# Patient Record
Sex: FEMALE | Race: WHITE | NOT HISPANIC OR LATINO | Employment: OTHER | URBAN - METROPOLITAN AREA
[De-identification: names, ages, dates, MRNs, and addresses within clinical notes are randomized per-mention and may not be internally consistent; named-entity substitution may affect disease eponyms.]

---

## 2022-04-28 ENCOUNTER — TELEPHONE (OUTPATIENT)
Dept: UROLOGY | Facility: CLINIC | Age: 72
End: 2022-04-28
Payer: COMMERCIAL

## 2022-05-26 ENCOUNTER — OFFICE VISIT (OUTPATIENT)
Dept: UROLOGY | Facility: CLINIC | Age: 72
End: 2022-05-26
Payer: COMMERCIAL

## 2022-05-26 DIAGNOSIS — N39.41 URGE INCONTINENCE: Primary | ICD-10-CM

## 2022-05-26 LAB — POC RESIDUAL URINE VOLUME: 2 ML (ref 0–100)

## 2022-05-26 PROCEDURE — 99203 PR OFFICE/OUTPT VISIT, NEW, LEVL III, 30-44 MIN: ICD-10-PCS | Mod: S$GLB,,, | Performed by: NURSE PRACTITIONER

## 2022-05-26 PROCEDURE — 51798 POCT BLADDER SCAN: ICD-10-PCS | Mod: S$GLB,,, | Performed by: NURSE PRACTITIONER

## 2022-05-26 PROCEDURE — 1159F PR MEDICATION LIST DOCUMENTED IN MEDICAL RECORD: ICD-10-PCS | Mod: CPTII,S$GLB,, | Performed by: NURSE PRACTITIONER

## 2022-05-26 PROCEDURE — 51798 US URINE CAPACITY MEASURE: CPT | Mod: S$GLB,,, | Performed by: NURSE PRACTITIONER

## 2022-05-26 PROCEDURE — 99203 OFFICE O/P NEW LOW 30 MIN: CPT | Mod: S$GLB,,, | Performed by: NURSE PRACTITIONER

## 2022-05-26 PROCEDURE — 1159F MED LIST DOCD IN RCRD: CPT | Mod: CPTII,S$GLB,, | Performed by: NURSE PRACTITIONER

## 2022-05-26 PROCEDURE — 1160F PR REVIEW ALL MEDS BY PRESCRIBER/CLIN PHARMACIST DOCUMENTED: ICD-10-PCS | Mod: CPTII,S$GLB,, | Performed by: NURSE PRACTITIONER

## 2022-05-26 PROCEDURE — 1160F RVW MEDS BY RX/DR IN RCRD: CPT | Mod: CPTII,S$GLB,, | Performed by: NURSE PRACTITIONER

## 2022-05-26 RX ORDER — SIMVASTATIN 20 MG/1
TABLET, FILM COATED ORAL
COMMUNITY
Start: 2022-04-06

## 2022-05-26 RX ORDER — BIOTIN 10 MG
TABLET ORAL
COMMUNITY

## 2022-05-26 RX ORDER — OXYBUTYNIN CHLORIDE 15 MG/1
TABLET, EXTENDED RELEASE ORAL
COMMUNITY
Start: 2022-03-13 | End: 2022-08-26 | Stop reason: ALTCHOICE

## 2022-05-26 RX ORDER — AMOXICILLIN 500 MG
CAPSULE ORAL DAILY
COMMUNITY

## 2022-05-26 RX ORDER — NAPROXEN SODIUM 220 MG/1
81 TABLET, FILM COATED ORAL EVERY OTHER DAY
COMMUNITY

## 2022-05-26 RX ORDER — LEVOTHYROXINE SODIUM 50 UG/1
TABLET ORAL
COMMUNITY
Start: 2022-04-06

## 2022-05-26 NOTE — PROGRESS NOTES
Subjective:       Patient ID: Clarisa Silvestre is a 71 y.o. female.    Chief Complaint: urinary incontienence      HPI: 71-year-old female, new to [facility].  Patient presents with complaint of urge incontinence.  Patient states he has leakage his with episodes of urgency.  Patient states being on for probably 2 years.  She was started on oxybutynin by her PCP.  States he was initially started on 5 mg without any significant improvement.  That dose was increased to 15 mg daily.  Patient states this was successful up until approximately 2 months ago.  Two months ago she started having a recurrence of leakage.    She denies any pain or burning urination.  Denies any odor urine.  Denies any fever.  Denies any body aches.  Denies any blood in urine.  No other urinary complaints at this time.       Past Medical History:   Past Medical History:   Diagnosis Date    DVT (deep venous thrombosis)     left leg 1987    Malignant melanoma of skin, unspecified     2002/2004 mole to left arm    Mixed hyperlipidemia     Thyroid disease        Past Surgical Historical:   Past Surgical History:   Procedure Laterality Date    HYSTERECTOMY      melanoma removal Left     arm        Medications:   Medication List with Changes/Refills   Current Medications    ASPIRIN 81 MG CHEW    Take 81 mg by mouth every other day.    BIOTIN 10 MG TAB    Take by mouth.    LEVOTHYROXINE (SYNTHROID) 50 MCG TABLET        OMEGA-3 FATTY ACIDS/FISH OIL (FISH OIL-OMEGA-3 FATTY ACIDS) 300-1,000 MG CAPSULE    Take by mouth once daily.    OXYBUTYNIN (DITROPAN XL) 15 MG TR24        SIMVASTATIN (ZOCOR) 20 MG TABLET            Past Social History:   Social History     Socioeconomic History    Marital status:    Tobacco Use    Smoking status: Never Smoker    Smokeless tobacco: Never Used   Substance and Sexual Activity    Alcohol use: Yes     Alcohol/week: 1.0 standard drink     Types: 1 Glasses of wine per week     Comment: every couple of  months    Drug use: Never    Sexual activity: Yes     Partners: Male       Allergies: Review of patient's allergies indicates:  No Known Allergies     Family History:   Family History   Problem Relation Age of Onset    Hyperlipidemia Father     Memory loss Father     Heart disease Mother     Kidney disease Mother     Cancer Paternal Grandmother     Heart disease Brother     Heart attack Brother         Review of Systems:  Review of Systems   Constitutional: Negative for activity change and appetite change.   HENT: Negative for congestion and dental problem.    Respiratory: Negative for chest tightness and shortness of breath.    Cardiovascular: Negative for chest pain.   Gastrointestinal: Negative for abdominal distention.   Genitourinary: Positive for urgency. Negative for decreased urine volume, difficulty urinating, dyspareunia, dysuria, enuresis, flank pain, frequency, genital sores, hematuria and pelvic pain.   Musculoskeletal: Negative for back pain and neck pain.   Allergic/Immunologic: Negative for immunocompromised state.   Neurological: Negative for dizziness.   Hematological: Negative for adenopathy.   Psychiatric/Behavioral: Negative for agitation, behavioral problems and confusion.       Physical Exam:  Physical Exam  Vitals and nursing note reviewed.   Constitutional:       Appearance: She is well-developed.   HENT:      Head: Normocephalic.   Cardiovascular:      Rate and Rhythm: Normal rate and regular rhythm.      Heart sounds: Normal heart sounds.   Pulmonary:      Effort: Pulmonary effort is normal.      Breath sounds: Normal breath sounds.   Abdominal:      General: Bowel sounds are normal.      Palpations: Abdomen is soft.   Skin:     General: Skin is warm and dry.   Neurological:      Mental Status: She is alert and oriented to person, place, and time.       Urinalysis:  Trace intact blood, blood cells 0-3.  Bladder scan:  2 cc    Assessment/Plan:   Urge incontinence:  Patient has  recently started experiencing failure with oxybutynin XL 15 mg daily.  Do trial run of Myrbetriq 25 mg daily.  Patient provided 8 weeks supply of samples.  Will re-evaluate at next visit.    Will plan follow-up in 8 weeks, sooner if needed.  Problem List Items Addressed This Visit    None     Visit Diagnoses     Urge incontinence    -  Primary    Relevant Orders    POCT Urinalysis (w/Micro Option)    POCT Bladder Scan

## 2022-07-28 ENCOUNTER — OFFICE VISIT (OUTPATIENT)
Dept: UROLOGY | Facility: CLINIC | Age: 72
End: 2022-07-28
Payer: COMMERCIAL

## 2022-07-28 DIAGNOSIS — N39.41 URGE INCONTINENCE: Primary | ICD-10-CM

## 2022-07-28 LAB — POC RESIDUAL URINE VOLUME: 0 ML (ref 0–100)

## 2022-07-28 PROCEDURE — 1160F RVW MEDS BY RX/DR IN RCRD: CPT | Mod: CPTII,S$GLB,, | Performed by: NURSE PRACTITIONER

## 2022-07-28 PROCEDURE — 1160F PR REVIEW ALL MEDS BY PRESCRIBER/CLIN PHARMACIST DOCUMENTED: ICD-10-PCS | Mod: CPTII,S$GLB,, | Performed by: NURSE PRACTITIONER

## 2022-07-28 PROCEDURE — 99213 OFFICE O/P EST LOW 20 MIN: CPT | Mod: S$GLB,,, | Performed by: NURSE PRACTITIONER

## 2022-07-28 PROCEDURE — 51798 US URINE CAPACITY MEASURE: CPT | Mod: S$GLB,,, | Performed by: NURSE PRACTITIONER

## 2022-07-28 PROCEDURE — 1159F MED LIST DOCD IN RCRD: CPT | Mod: CPTII,S$GLB,, | Performed by: NURSE PRACTITIONER

## 2022-07-28 PROCEDURE — 99213 PR OFFICE/OUTPT VISIT, EST, LEVL III, 20-29 MIN: ICD-10-PCS | Mod: S$GLB,,, | Performed by: NURSE PRACTITIONER

## 2022-07-28 PROCEDURE — 1159F PR MEDICATION LIST DOCUMENTED IN MEDICAL RECORD: ICD-10-PCS | Mod: CPTII,S$GLB,, | Performed by: NURSE PRACTITIONER

## 2022-07-28 PROCEDURE — 51798 POCT BLADDER SCAN: ICD-10-PCS | Mod: S$GLB,,, | Performed by: NURSE PRACTITIONER

## 2022-07-28 RX ORDER — GUAIFENESIN AND PHENYLEPHRINE HCL 385; 10 MG/1; MG/1
1 TABLET ORAL EVERY 4 HOURS PRN
COMMUNITY
Start: 2022-07-21

## 2022-07-28 RX ORDER — NIRMATRELVIR AND RITONAVIR 300-100 MG
KIT ORAL
COMMUNITY
Start: 2022-07-21

## 2022-08-26 ENCOUNTER — TELEPHONE (OUTPATIENT)
Dept: UROLOGY | Facility: CLINIC | Age: 72
End: 2022-08-26
Payer: COMMERCIAL

## 2022-08-26 RX ORDER — SOLIFENACIN SUCCINATE 10 MG/1
10 TABLET, FILM COATED ORAL DAILY
Qty: 30 TABLET | Refills: 11 | Status: SHIPPED | OUTPATIENT
Start: 2022-08-26 | End: 2022-11-10 | Stop reason: SDUPTHER

## 2022-08-26 NOTE — TELEPHONE ENCOUNTER
Will try Vesicare    ----- Message from Yelena Azar MA sent at 8/25/2022  4:47 PM CDT -----    ----- Message -----  From: Shama Cheema  Sent: 8/25/2022  12:57 PM CDT  To: Emiliano Oliver Staff    Type:  Needs Medical Advice    Who Called: Clarisa Silvestre    Symptoms (please be specific): -   How long has patient had these symptoms:  -  Pharmacy name and phone #:    [facility] (Now [facility]) - [place], [place] - [address]  [address] [address]  Phone: [phone] Fax: [phone]  Would the patient rather a call back or a response via [facility]?    Best Call Back Number: [phone]    Additional Information: pt wants to know if there is something more affordable that you can call out for her she says the myrbetriq samples worked how ever the medication is to expensive for her

## 2022-11-10 RX ORDER — SOLIFENACIN SUCCINATE 10 MG/1
10 TABLET, FILM COATED ORAL DAILY
Qty: 90 TABLET | Refills: 0 | Status: SHIPPED | OUTPATIENT
Start: 2022-11-10 | End: 2023-01-11 | Stop reason: SDUPTHER

## 2023-01-11 ENCOUNTER — OFFICE VISIT (OUTPATIENT)
Dept: UROLOGY | Facility: CLINIC | Age: 73
End: 2023-01-11
Payer: COMMERCIAL

## 2023-01-11 VITALS
WEIGHT: 217 LBS | BODY MASS INDEX: 38.45 KG/M2 | SYSTOLIC BLOOD PRESSURE: 133 MMHG | HEART RATE: 90 BPM | HEIGHT: 63 IN | DIASTOLIC BLOOD PRESSURE: 70 MMHG

## 2023-01-11 DIAGNOSIS — N39.41 URGE INCONTINENCE: Primary | ICD-10-CM

## 2023-01-11 PROCEDURE — 1159F PR MEDICATION LIST DOCUMENTED IN MEDICAL RECORD: ICD-10-PCS | Mod: CPTII,S$GLB,, | Performed by: NURSE PRACTITIONER

## 2023-01-11 PROCEDURE — 1160F PR REVIEW ALL MEDS BY PRESCRIBER/CLIN PHARMACIST DOCUMENTED: ICD-10-PCS | Mod: CPTII,S$GLB,, | Performed by: NURSE PRACTITIONER

## 2023-01-11 PROCEDURE — 3078F DIAST BP <80 MM HG: CPT | Mod: CPTII,S$GLB,, | Performed by: NURSE PRACTITIONER

## 2023-01-11 PROCEDURE — 3075F PR MOST RECENT SYSTOLIC BLOOD PRESS GE 130-139MM HG: ICD-10-PCS | Mod: CPTII,S$GLB,, | Performed by: NURSE PRACTITIONER

## 2023-01-11 PROCEDURE — 3075F SYST BP GE 130 - 139MM HG: CPT | Mod: CPTII,S$GLB,, | Performed by: NURSE PRACTITIONER

## 2023-01-11 PROCEDURE — 99213 PR OFFICE/OUTPT VISIT, EST, LEVL III, 20-29 MIN: ICD-10-PCS | Mod: S$GLB,,, | Performed by: NURSE PRACTITIONER

## 2023-01-11 PROCEDURE — 3008F BODY MASS INDEX DOCD: CPT | Mod: CPTII,S$GLB,, | Performed by: NURSE PRACTITIONER

## 2023-01-11 PROCEDURE — 3078F PR MOST RECENT DIASTOLIC BLOOD PRESSURE < 80 MM HG: ICD-10-PCS | Mod: CPTII,S$GLB,, | Performed by: NURSE PRACTITIONER

## 2023-01-11 PROCEDURE — 99213 OFFICE O/P EST LOW 20 MIN: CPT | Mod: S$GLB,,, | Performed by: NURSE PRACTITIONER

## 2023-01-11 PROCEDURE — 1159F MED LIST DOCD IN RCRD: CPT | Mod: CPTII,S$GLB,, | Performed by: NURSE PRACTITIONER

## 2023-01-11 PROCEDURE — 3008F PR BODY MASS INDEX (BMI) DOCUMENTED: ICD-10-PCS | Mod: CPTII,S$GLB,, | Performed by: NURSE PRACTITIONER

## 2023-01-11 PROCEDURE — 1160F RVW MEDS BY RX/DR IN RCRD: CPT | Mod: CPTII,S$GLB,, | Performed by: NURSE PRACTITIONER

## 2023-01-11 RX ORDER — SOLIFENACIN SUCCINATE 10 MG/1
10 TABLET, FILM COATED ORAL DAILY
Qty: 90 TABLET | Refills: 3 | Status: SHIPPED | OUTPATIENT
Start: 2023-01-11 | End: 2024-02-22 | Stop reason: SDUPTHER

## 2023-01-11 NOTE — PROGRESS NOTES
Subjective:       Patient ID: Clarisa Silvestre is a 72 y.o. female.    Chief Complaint: Other (3mth rescheduled/vesicare working)      HPI: 72-year-old female, established patient, presents for 6 month visit.    Patient has history of urge incontinence.    Patient was on oxybutynin XL 15 mg daily.  Oxybutynin was helping.  Patient had rated her improvement at 75%.    We tried Myrbetriq 25 mg which did not provide any relief.  We tried higher dose of 50 mg which again did not provide any relief.    We switched her to VESIcare 10 mg.    Patient states VESIcare provides 95% improvement of her symptoms.    Patient denies any pain or burning urination.  Denies any difficulty voiding.  Significant frequency, urgency, or nocturia.    Denies any leakage with stress.  Leakage with urgency is well controlled.    No other urinary complaints at this time.       Past Medical History:   Past Medical History:   Diagnosis Date    COVID 07/2022    DVT (deep venous thrombosis)     left leg 1987    Malignant melanoma of skin, unspecified     2002/2004 mole to left arm    Mixed hyperlipidemia     Thyroid disease        Past Surgical Historical:   Past Surgical History:   Procedure Laterality Date    HYSTERECTOMY      melanoma removal Left     arm        Medications:   Medication List with Changes/Refills   Current Medications    ASPIRIN 81 MG CHEW    Take 81 mg by mouth every other day.    BIOTIN 10 MG TAB    Take by mouth.    DECONEX IR  MG TAB    Take 1 tablet by mouth every 4 (four) hours as needed.    LEVOTHYROXINE (SYNTHROID) 50 MCG TABLET        OMEGA-3 FATTY ACIDS/FISH OIL (FISH OIL-OMEGA-3 FATTY ACIDS) 300-1,000 MG CAPSULE    Take by mouth once daily.    PAXLOVID, EUA, 300 MG (150 MG X 2)-100 MG COPACKAGED TABLETS (EUA)    TK 2 NIRMATRELVIR TS AND 1 RITONAVIR T TOGETHER PO BID FOR 5 DAYS BID FOR 5 DAYS    SIMVASTATIN (ZOCOR) 20 MG TABLET       Changed and/or Refilled Medications    Modified Medication Previous Medication     SOLIFENACIN (VESICARE) 10 MG TABLET solifenacin (VESICARE) 10 MG tablet       Take 1 tablet (10 mg total) by mouth once daily.    Take 1 tablet (10 mg total) by mouth once daily.        Past Social History:   Social History     Socioeconomic History    Marital status:    Tobacco Use    Smoking status: Never    Smokeless tobacco: Never   Substance and Sexual Activity    Alcohol use: Yes     Alcohol/week: 1.0 standard drink     Types: 1 Glasses of wine per week     Comment: every couple of months    Drug use: Never    Sexual activity: Yes     Partners: Male       Allergies: Review of patient's allergies indicates:  No Known Allergies     Family History:   Family History   Problem Relation Age of Onset    Hyperlipidemia Father     Memory loss Father     Heart disease Mother     Kidney disease Mother     Cancer Paternal Grandmother     Heart disease Brother     Heart attack Brother         Review of Systems:  Review of Systems   Constitutional:  Negative for activity change and appetite change.   HENT:  Negative for congestion and dental problem.    Respiratory:  Negative for chest tightness and shortness of breath.    Cardiovascular:  Negative for chest pain.   Gastrointestinal:  Negative for abdominal distention.   Genitourinary:  Negative for decreased urine volume, difficulty urinating, dyspareunia, dysuria, enuresis, flank pain, frequency, genital sores, hematuria, pelvic pain and urgency.   Musculoskeletal:  Negative for back pain and neck pain.   Allergic/Immunologic: Negative for immunocompromised state.   Neurological:  Negative for dizziness.   Hematological:  Negative for adenopathy.   Psychiatric/Behavioral:  Negative for agitation, behavioral problems and confusion.      Physical Exam:  Physical Exam  Vitals and nursing note reviewed.   Constitutional:       Appearance: She is well-developed.   HENT:      Head: Normocephalic.   Cardiovascular:      Rate and Rhythm: Normal rate and regular rhythm.       Heart sounds: Normal heart sounds.   Pulmonary:      Effort: Pulmonary effort is normal.      Breath sounds: Normal breath sounds.   Abdominal:      General: Bowel sounds are normal.      Palpations: Abdomen is soft.   Skin:     General: Skin is warm and dry.   Neurological:      Mental Status: She is alert and oriented to person, place, and time.       Assessment/Plan:   Urge incontinence:  Patient is doing VESIcare 10 mg daily.    Patient will continue as directed.  Refill sent to pharmacy.      Follow-up 1 year, sooner if needed.  Problem List Items Addressed This Visit    None  Visit Diagnoses       Urge incontinence    -  Primary

## 2024-02-22 ENCOUNTER — OFFICE VISIT (OUTPATIENT)
Dept: UROLOGY | Facility: CLINIC | Age: 74
End: 2024-02-22
Payer: COMMERCIAL

## 2024-02-22 VITALS
HEART RATE: 87 BPM | DIASTOLIC BLOOD PRESSURE: 88 MMHG | WEIGHT: 171 LBS | HEIGHT: 63 IN | OXYGEN SATURATION: 98 % | BODY MASS INDEX: 30.3 KG/M2 | SYSTOLIC BLOOD PRESSURE: 124 MMHG

## 2024-02-22 DIAGNOSIS — N39.41 URGE INCONTINENCE: Primary | ICD-10-CM

## 2024-02-22 LAB
BILIRUBIN, UA POC OHS: NEGATIVE
BLOOD, UA POC OHS: ABNORMAL
CLARITY, UA POC OHS: CLEAR
COLOR, UA POC OHS: YELLOW
GLUCOSE, UA POC OHS: NEGATIVE
KETONES, UA POC OHS: NEGATIVE
LEUKOCYTES, UA POC OHS: ABNORMAL
NITRITE, UA POC OHS: NEGATIVE
PH, UA POC OHS: 6
PROTEIN, UA POC OHS: NEGATIVE
SPECIFIC GRAVITY, UA POC OHS: 1.01
UROBILINOGEN, UA POC OHS: 0.2

## 2024-02-22 PROCEDURE — 1101F PT FALLS ASSESS-DOCD LE1/YR: CPT | Mod: CPTII,S$GLB,, | Performed by: NURSE PRACTITIONER

## 2024-02-22 PROCEDURE — 1126F AMNT PAIN NOTED NONE PRSNT: CPT | Mod: CPTII,S$GLB,, | Performed by: NURSE PRACTITIONER

## 2024-02-22 PROCEDURE — 99213 OFFICE O/P EST LOW 20 MIN: CPT | Mod: S$GLB,,, | Performed by: NURSE PRACTITIONER

## 2024-02-22 PROCEDURE — 1160F RVW MEDS BY RX/DR IN RCRD: CPT | Mod: CPTII,S$GLB,, | Performed by: NURSE PRACTITIONER

## 2024-02-22 PROCEDURE — 3079F DIAST BP 80-89 MM HG: CPT | Mod: CPTII,S$GLB,, | Performed by: NURSE PRACTITIONER

## 2024-02-22 PROCEDURE — 81003 URINALYSIS AUTO W/O SCOPE: CPT | Mod: QW,S$GLB,, | Performed by: NURSE PRACTITIONER

## 2024-02-22 PROCEDURE — 3288F FALL RISK ASSESSMENT DOCD: CPT | Mod: CPTII,S$GLB,, | Performed by: NURSE PRACTITIONER

## 2024-02-22 PROCEDURE — 3008F BODY MASS INDEX DOCD: CPT | Mod: CPTII,S$GLB,, | Performed by: NURSE PRACTITIONER

## 2024-02-22 PROCEDURE — 1159F MED LIST DOCD IN RCRD: CPT | Mod: CPTII,S$GLB,, | Performed by: NURSE PRACTITIONER

## 2024-02-22 PROCEDURE — 3074F SYST BP LT 130 MM HG: CPT | Mod: CPTII,S$GLB,, | Performed by: NURSE PRACTITIONER

## 2024-02-22 RX ORDER — SOLIFENACIN SUCCINATE 10 MG/1
10 TABLET, FILM COATED ORAL DAILY
Qty: 90 TABLET | Refills: 3 | Status: SHIPPED | OUTPATIENT
Start: 2024-02-22 | End: 2025-02-21

## 2024-02-22 NOTE — PROGRESS NOTES
Subjective:       Patient ID: Clarisa Silvestre is a 73 y.o. female.    Chief Complaint: urge incontinance      HPI: 73-year-old female, established patient, presents for yearly visit.    Patient has history of urge incontinence.    Patient is maintained on solifenacin 10 mg daily.    Patient states is working well with no significant complaints.  On a scale of 10 she would rate her satisfaction an 8/10.  She denies any significant frequency or urgency.  May have some occasional leakage if she does not go to the bathroom  Denies any fever or body aches.  Denies any pain or burning urination.  Denies any odor urine.    No other urinary complaints at this time.         Past Medical History:   Past Medical History:   Diagnosis Date    COVID 07/2022    DVT (deep venous thrombosis)     left leg 1987    Malignant melanoma of skin, unspecified     2002/2004 mole to left arm    Mixed hyperlipidemia     Thyroid disease        Past Surgical Historical:   Past Surgical History:   Procedure Laterality Date    BREAST BIOPSY Right     HYSTERECTOMY      melanoma removal Left     arm        Medications:   Medication List with Changes/Refills   Current Medications    ASPIRIN 81 MG CHEW    Take 81 mg by mouth every other day.    BIOTIN 10 MG TAB    Take by mouth.    DECONEX IR  MG TAB    Take 1 tablet by mouth every 4 (four) hours as needed.    LEVOTHYROXINE (SYNTHROID) 50 MCG TABLET        OMEGA-3 FATTY ACIDS/FISH OIL (FISH OIL-OMEGA-3 FATTY ACIDS) 300-1,000 MG CAPSULE    Take by mouth once daily.    PAXLOVID, EUA, 300 MG (150 MG X 2)-100 MG COPACKAGED TABLETS (EUA)    TK 2 NIRMATRELVIR TS AND 1 RITONAVIR T TOGETHER PO BID FOR 5 DAYS BID FOR 5 DAYS    SIMVASTATIN (ZOCOR) 20 MG TABLET       Changed and/or Refilled Medications    Modified Medication Previous Medication    SOLIFENACIN (VESICARE) 10 MG TABLET solifenacin (VESICARE) 10 MG tablet       Take 1 tablet (10 mg total) by mouth once daily.    Take 1 tablet (10 mg total) by  mouth once daily.        Past Social History:   Social History     Socioeconomic History    Marital status:    Tobacco Use    Smoking status: Never    Smokeless tobacco: Never   Substance and Sexual Activity    Alcohol use: Yes     Alcohol/week: 1.0 standard drink of alcohol     Types: 1 Glasses of wine per week     Comment: every couple of months    Drug use: Never    Sexual activity: Yes     Partners: Male       Allergies: Review of patient's allergies indicates:  No Known Allergies     Family History:   Family History   Problem Relation Age of Onset    Hyperlipidemia Father     Memory loss Father     Heart disease Mother     Kidney disease Mother     Cancer Paternal Grandmother     Heart disease Brother     Heart attack Brother         Review of Systems:  Review of Systems   Constitutional:  Negative for activity change and appetite change.   HENT:  Negative for congestion and dental problem.    Respiratory:  Negative for chest tightness and shortness of breath.    Cardiovascular:  Negative for chest pain.   Gastrointestinal:  Negative for abdominal distention.   Genitourinary:  Negative for decreased urine volume, difficulty urinating, dyspareunia, dysuria, enuresis, flank pain, frequency, genital sores, hematuria, pelvic pain and urgency.   Musculoskeletal:  Negative for back pain and neck pain.   Allergic/Immunologic: Negative for immunocompromised state.   Neurological:  Negative for dizziness.   Hematological:  Negative for adenopathy.   Psychiatric/Behavioral:  Negative for agitation, behavioral problems and confusion.        Physical Exam:  Physical Exam  Vitals and nursing note reviewed.   Constitutional:       Appearance: She is well-developed.   HENT:      Head: Normocephalic.   Cardiovascular:      Rate and Rhythm: Normal rate and regular rhythm.      Heart sounds: Normal heart sounds.   Pulmonary:      Effort: Pulmonary effort is normal.      Breath sounds: Normal breath sounds.   Abdominal:       General: Bowel sounds are normal.      Palpations: Abdomen is soft.   Skin:     General: Skin is warm and dry.   Neurological:      Mental Status: She is alert and oriented to person, place, and time.       Urinalysis: Small leukocytes, white blood cell 6 to 10, bacteria +1.  Trace lysed blood, red blood cells 0-3.    Assessment/Plan:   1. Urge incontinence:  Patient is doing well on solifenacin 10 mg daily.    Patient will continue as directed.  Refill sent to pharmacy.    Did discuss that she had some bacteria in her urine.  Patient declines a urine culture.      Patient follow-up in 1 year, sooner if needed.  Problem List Items Addressed This Visit    None  Visit Diagnoses       Urge incontinence    -  Primary    Relevant Medications    solifenacin (VESICARE) 10 MG tablet    Other Relevant Orders    POCT Urinalysis(Instrument)

## 2024-12-09 DIAGNOSIS — N39.41 URGE INCONTINENCE: ICD-10-CM

## 2024-12-09 RX ORDER — SOLIFENACIN SUCCINATE 10 MG/1
10 TABLET, FILM COATED ORAL
Qty: 90 TABLET | Refills: 0 | Status: SHIPPED | OUTPATIENT
Start: 2024-12-09

## 2025-02-21 DIAGNOSIS — N39.41 URGE INCONTINENCE: ICD-10-CM

## 2025-02-21 RX ORDER — SOLIFENACIN SUCCINATE 10 MG/1
10 TABLET, FILM COATED ORAL
Qty: 90 TABLET | Refills: 0 | Status: SHIPPED | OUTPATIENT
Start: 2025-02-21

## 2025-02-21 NOTE — TELEPHONE ENCOUNTER
Please see the attached refill request. Patient has a follow up scheduled in April on the 22nd. This is the earliest she can be seen.

## 2025-04-22 ENCOUNTER — OFFICE VISIT (OUTPATIENT)
Dept: UROLOGY | Facility: CLINIC | Age: 75
End: 2025-04-22
Payer: MEDICARE

## 2025-04-22 VITALS
HEIGHT: 62 IN | SYSTOLIC BLOOD PRESSURE: 124 MMHG | BODY MASS INDEX: 37.17 KG/M2 | HEART RATE: 74 BPM | RESPIRATION RATE: 18 BRPM | WEIGHT: 202 LBS | OXYGEN SATURATION: 98 % | DIASTOLIC BLOOD PRESSURE: 78 MMHG

## 2025-04-22 DIAGNOSIS — N39.41 URGE INCONTINENCE: ICD-10-CM

## 2025-04-22 LAB
BILIRUBIN, UA POC OHS: NEGATIVE
BLOOD, UA POC OHS: NEGATIVE
CLARITY, UA POC OHS: CLEAR
COLOR, UA POC OHS: YELLOW
GLUCOSE, UA POC OHS: NEGATIVE
KETONES, UA POC OHS: NEGATIVE
LEUKOCYTES, UA POC OHS: ABNORMAL
NITRITE, UA POC OHS: NEGATIVE
PH, UA POC OHS: 6.5
PROTEIN, UA POC OHS: NEGATIVE
SPECIFIC GRAVITY, UA POC OHS: 1.01
UROBILINOGEN, UA POC OHS: 0.2

## 2025-04-22 PROCEDURE — 99213 OFFICE O/P EST LOW 20 MIN: CPT | Mod: S$PBB,,, | Performed by: NURSE PRACTITIONER

## 2025-04-22 PROCEDURE — 1160F RVW MEDS BY RX/DR IN RCRD: CPT | Mod: CPTII,,, | Performed by: NURSE PRACTITIONER

## 2025-04-22 PROCEDURE — 3074F SYST BP LT 130 MM HG: CPT | Mod: CPTII,,, | Performed by: NURSE PRACTITIONER

## 2025-04-22 PROCEDURE — 1101F PT FALLS ASSESS-DOCD LE1/YR: CPT | Mod: CPTII,,, | Performed by: NURSE PRACTITIONER

## 2025-04-22 PROCEDURE — 1159F MED LIST DOCD IN RCRD: CPT | Mod: CPTII,,, | Performed by: NURSE PRACTITIONER

## 2025-04-22 PROCEDURE — 3078F DIAST BP <80 MM HG: CPT | Mod: CPTII,,, | Performed by: NURSE PRACTITIONER

## 2025-04-22 PROCEDURE — 1126F AMNT PAIN NOTED NONE PRSNT: CPT | Mod: CPTII,,, | Performed by: NURSE PRACTITIONER

## 2025-04-22 PROCEDURE — 3008F BODY MASS INDEX DOCD: CPT | Mod: CPTII,,, | Performed by: NURSE PRACTITIONER

## 2025-04-22 PROCEDURE — 3288F FALL RISK ASSESSMENT DOCD: CPT | Mod: CPTII,,, | Performed by: NURSE PRACTITIONER

## 2025-04-22 RX ORDER — DENOSUMAB 60 MG/ML
60 INJECTION SUBCUTANEOUS
COMMUNITY

## 2025-04-22 RX ORDER — SOLIFENACIN SUCCINATE 10 MG/1
10 TABLET, FILM COATED ORAL DAILY
Qty: 90 TABLET | Refills: 3 | Status: SHIPPED | OUTPATIENT
Start: 2025-04-22 | End: 2026-04-22

## 2025-04-22 RX ORDER — ACETAMINOPHEN 500 MG
5000 TABLET ORAL DAILY
COMMUNITY

## 2025-04-22 RX ORDER — ANASTROZOLE 1 MG/1
1 TABLET ORAL
COMMUNITY
Start: 2024-10-14

## 2025-04-22 NOTE — PROGRESS NOTES
Subjective:       Patient ID: Clarisa Silvestre is a 74 y.o. female.    Chief Complaint: Follow-up      HPI: 74-year-old female, established patient, presents for yearly visit.    Patient has history of urge incontinence.  She is maintained on solifenacin 10 mg daily.    She had previously failed oxybutynin and Myrbetriq  Had her visit year ago she rated solifenacin at an 8/10 on level of effective in his.  She states medication is less effective than was a year ago.  She gives it a 6/10 to 7/10.      She denies any pain or burning urination.  Denies any odor to the urine.  Denies any fever or body aches.  Denies any blood in the urine.    She has recently had breast cancer with a right lumpectomy in March 2024 and underwent 20 rounds of radiation.      No other urinary complaints at this time.         Past Medical History:   Past Medical History:   Diagnosis Date    Breast cancer     Stage 1 February 26, 2024    COVID 07/2022    DVT (deep venous thrombosis)     left leg 1987    Malignant melanoma of skin, unspecified     2002/2004 mole to left arm    Mixed hyperlipidemia     Thyroid disease        Past Surgical Historical:   Past Surgical History:   Procedure Laterality Date    BREAST BIOPSY Right     HYSTERECTOMY      LUMPECTOMY, BREAST Right     3/14/24    melanoma removal Left     arm        Medications:   Medication List with Changes/Refills   Current Medications    ANASTROZOLE (ARIMIDEX) 1 MG TAB    Take 1 mg by mouth.    ASPIRIN 81 MG CHEW    Take 81 mg by mouth every other day.    BIOTIN 10 MG TAB    Take by mouth.    CALCIUM-D3-CHOLINE-SILICON 275 MG-25 MCG /60 MG-3 MG CAP    Take 1 capsule by mouth 2 (two) times daily.    CHOLECALCIFEROL, VITAMIN D3, (VITAMIN D3) 125 MCG (5,000 UNIT) TAB    Take 5,000 Units by mouth once daily.    DECONEX IR  MG TAB    Take 1 tablet by mouth every 4 (four) hours as needed.    DENOSUMAB (PROLIA) 60 MG/ML SYRG    Inject 60 mg into the skin.    LEVOTHYROXINE (SYNTHROID)  50 MCG TABLET        OMEGA-3 FATTY ACIDS/FISH OIL (FISH OIL-OMEGA-3 FATTY ACIDS) 300-1,000 MG CAPSULE    Take by mouth once daily.    PAXLOVID, EUA, 300 MG (150 MG X 2)-100 MG COPACKAGED TABLETS (EUA)    TK 2 NIRMATRELVIR TS AND 1 RITONAVIR T TOGETHER PO BID FOR 5 DAYS BID FOR 5 DAYS    SIMVASTATIN (ZOCOR) 20 MG TABLET       Changed and/or Refilled Medications    Modified Medication Previous Medication    SOLIFENACIN (VESICARE) 10 MG TABLET solifenacin (VESICARE) 10 MG tablet       Take 1 tablet (10 mg total) by mouth once daily.    TAKE 1 TABLET EVERY DAY        Past Social History: Social History[1]    Allergies: Review of patient's allergies indicates:  No Known Allergies     Family History:   Family History   Problem Relation Name Age of Onset    Hyperlipidemia Father      Memory loss Father      Heart disease Mother      Kidney disease Mother      Cancer Paternal Grandmother      Heart disease Brother      Heart attack Brother          Review of Systems:  Review of Systems   Constitutional:  Negative for activity change and appetite change.   HENT:  Negative for congestion and dental problem.    Respiratory:  Negative for chest tightness and shortness of breath.    Cardiovascular:  Negative for chest pain.   Gastrointestinal:  Negative for abdominal distention.   Genitourinary:  Negative for decreased urine volume, difficulty urinating, dyspareunia, dysuria, enuresis, flank pain, frequency, genital sores, hematuria, pelvic pain and urgency.   Musculoskeletal:  Negative for back pain and neck pain.   Allergic/Immunologic: Negative for immunocompromised state.   Neurological:  Negative for dizziness.   Hematological:  Negative for adenopathy.   Psychiatric/Behavioral:  Negative for agitation, behavioral problems and confusion.        Physical Exam:  Physical Exam  Vitals and nursing note reviewed.   Constitutional:       Appearance: She is well-developed.   HENT:      Head: Normocephalic.   Cardiovascular:       Rate and Rhythm: Normal rate and regular rhythm.      Heart sounds: Normal heart sounds.   Pulmonary:      Effort: Pulmonary effort is normal.      Breath sounds: Normal breath sounds.   Abdominal:      General: Bowel sounds are normal.      Palpations: Abdomen is soft.   Skin:     General: Skin is warm and dry.   Neurological:      Mental Status: She is alert and oriented to person, place, and time.       Urinalysis: Trace leukocytes, white blood cells 0-2, bacteria negative.    Bladder scan:  66 cc    Assessment/Plan:   Urge incontinence:  Patient has previously failed oxybutynin and Myrbetriq.    She is on VESIcare 10 mg daily.  Patient states medication is providing less relief than it has in the past.    Refill VESIcare.  However, will try 4 weeks samples of Gemtesa 75 mg daily.  She will call notify us of the results.  Will feel prescription if it is working well.  Did briefly discuss Botox.  Not interested at this time.    Will plan follow-up in 1 year, sooner if needed.  Problem List Items Addressed This Visit    None  Visit Diagnoses         Urge incontinence        Relevant Medications    solifenacin (VESICARE) 10 MG tablet    Other Relevant Orders    POCT Bladder Scan    POCT Urinalysis(Instrument)                      [1]   Social History  Socioeconomic History    Marital status:    Tobacco Use    Smoking status: Never    Smokeless tobacco: Never   Substance and Sexual Activity    Alcohol use: Yes     Alcohol/week: 1.0 standard drink of alcohol     Types: 1 Glasses of wine per week     Comment: every couple of months    Drug use: Never    Sexual activity: Yes     Partners: Male

## 2025-05-06 ENCOUNTER — TELEPHONE (OUTPATIENT)
Dept: UROLOGY | Facility: CLINIC | Age: 75
End: 2025-05-06
Payer: MEDICARE

## 2025-05-06 NOTE — TELEPHONE ENCOUNTER
Patient states that the medication Gemtesa works but it will cost too much for her to get. When she called her insurance she stated they told her she would have to meet her deductible. She then stated that she will have to pay $962 for a 90 day supply and $462 for each refill after that until her deductible is met she believes. Patient was informed that I will reach out to the drug rep and find out what all can be done for this medication.     ----- Message from Blipifycooper sent at 5/6/2025  1:18 PM CDT -----  Contact: ELIA KIM [[id number]]  .Type:  Patient Requesting CallWho Called:ELIA KIM [[id number]]Does the patient know what this is regarding?:pt is calling to speak with nurse regarding a medicationWould the patient rather a call back or a response via [facility]? [facility] Call Back Number:.[phone] Information: